# Patient Record
(demographics unavailable — no encounter records)

---

## 2025-05-02 NOTE — HISTORY OF PRESENT ILLNESS
[de-identified] : HPI: 48F with pmhx of HLD, GERD underwent colonoscopy with extensive polyps removed pathology tubular adenoma. Additional polypoid lesion seen in the terminal ileum, atypical for adenoma, biopsies taken showing neuroendocrine tumor referred by Dr. Bridges.  C/o constipation but currently going daily, tolerating diet, denies weight loss. Guaiac stool was positive at PCP and pt was referred for colonoscopy.   24 PET- 1.  Intensely somatostatin-receptor avid lesion in the terminal ileum with adjacent metastatic lymphadenopathy. 2.  Focus within the liver suspicious for metastatic disease. 3.  Intensely avid mediastinal nodes, or distant metastatic disease above the diaphragm is not excluded.    MRI abd-IMPRESSION: 1. Small subtle hepatic metastasis. 2. 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 3. Metastatic mesenteric adenopathy.  Labs- CBC CMP CA 19-9- WNL CromoA-637.6  24- Pt reports feeling well, denies abdominal pain at this time. Tolerating diet. HR in increase as pt is very anxious to find out test results.   EGD/EUS (10/16/2024): One mediastinal lymph node abutting the mid esophagus was found at 24 cm from the incisors. It was round, hypoechoic, and well defined. It measured 8 mm by 5 mm in maximal dimensions. Fine needle biopsy was performed. Also, biopsy from stomach was obtained. Path-1 Stomach biopsy 2 PeriEsophageal lymph node FNB Final Diagnosis 1. Stomach, biopsy  Chronic gastritis, mild, inactive with focal foveolar hyperplasia  Cresyl violet stain for H. pylori is negative 2. Periesophageal lymph node, fineneedle biopsy  Scant fibrous and fibromuscular tissue and heme  Tissue insufficient for evaluation Pt has been on lanreotide injection monthly with Dr. Webster. Chromogranin level decreased from 637.6 to 148.1 12/10/2024,  189.3 as of 1/10/2025.  2025 PET (NW)- 1.  Previously seen intensely somatostatin-receptor avid lesion in the terminal ileum appears somewhat smaller with decreased uptake. However, the distribution of activity at immediately adjacent lymph nodes may have changed, with some nodes now suspected to demonstrate markedly increased somatostatin receptor uptake. 2.  Revisualized focus within the LEFT lobe of the liver is essentially unchanged. Additional small focus is suspected in the RIGHT lobe along the border of the liver; repeat MRI may be helpful. 3.  Unchanged somatostatin receptor avid paraesophageal node in the chest.  2025 Pt reports feeling well. Denies nausea, vomiting, diarrhea, hematochezia or steatorrhea, and unintentional weight loss at this time.   Endoscopy: Colonoscopy:  Mammogram:  Normal  Pmhx: HLD, anxiety  Pshx: Rt partial knee replacement Fhx: Maternal granmother colon, marternal uncle- colon  Social Hx: Denies smoking quit 3 months (smoked for 30 years), ETOH use on weekends or illicit drug use. 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   [facility] PCP: Dr. LAN, [address] [place] [address]. Directions [phone]. GI: Dr. Bridges

## 2025-05-02 NOTE — CONSULT LETTER
[Dear  ___] : Dear  [unfilled], [Consult Letter:] : I had the pleasure of evaluating your patient, [unfilled]. [( Thank you for referring [unfilled] for consultation for _____ )] : Thank you for referring [unfilled] for consultation for [unfilled] [Please see my note below.] : Please see my note below. [Consult Closing:] : Thank you very much for allowing me to participate in the care of this patient.  If you have any questions, please do not hesitate to contact me. [Sincerely,] : Sincerely, [FreeTextEntry3] : Vimal Cardenas MD, FICS, FACS, ADITI Director of Surgical Oncology- [facility] , Department of Surgery [facility] of [facility] at [address]- [address]   [address] [place], [place]- [address]   (mob) [phone] (o) [phone] (f) [phone] [DrAzam  ___] : Dr. BECERRA [DrAzam ___] : Dr. BECERRA

## 2025-05-02 NOTE — REASON FOR VISIT
[de-identified] : S/p robotic right hemicolectomy with microwave ablation of segment 3 liver lesion. [de-identified] : 04/26/2025

## 2025-05-02 NOTE — REASON FOR VISIT
[de-identified] : S/p robotic right hemicolectomy with microwave ablation of segment 3 liver lesion. [de-identified] : 04/26/2025

## 2025-05-02 NOTE — ASSESSMENT
[FreeTextEntry1] : 48F with pmhx of HLD, GERD underwent colonoscopy with extensive polyps removed pathology tubular adenoma. Additional polypoid lesion seen in the terminal ileum, atypical for adenoma, biopsies taken showing neuroendocrine tumor referred by Dr. Bridges. s/p robotic right hemicolectomy with microwave ablation of segment 3 liver lesion on 04/26/2025.  9/4/24 PET- 1. Intensely somatostatin-receptor avid lesion in the terminal ileum with adjacent metastatic lymphadenopathy. 2. Focus within the liver suspicious for metastatic disease. 3. Intensely avid mediastinal nodes, or distant metastatic disease above the diaphragm is not excluded.  02/03/2025- PET scan-  1.  Previously seen intensely somatostatin-receptor avid lesion in the terminal ileum appears somewhat smaller with decreased uptake. However, the distribution of activity at immediately adjacent lymph nodes may have changed, with some nodes now suspected to demonstrate markedly increased somatostatin receptor uptake. 2.  Revisualized focus within the LEFT lobe of the liver is essentially unchanged. Additional small focus is suspected in the RIGHT lobe along the border of the liver; repeat MRI may be helpful. 3.  Unchanged somatostatin receptor avid paraesophageal node in the chest.  02/06/2025 MRI abdomen- *  Stable subcentimeter metastasis within the left hepatic lobe compared to 9/18/2024. *  No new liver lesion. Specifically, no MRI correlate for the small focus of gallium dotatate activity seen within the right hepatic lobe on prior PET/CT.  Plan: -Recovered -RTO in 2 weeks    I have discussed the diagnosis, therapeutic plan and options with the patient at length. Patient expressed verbal understanding to proceed with proposed plan. All questions answered.

## 2025-05-02 NOTE — HISTORY OF PRESENT ILLNESS
[de-identified] : HPI: 48F with pmhx of HLD, GERD underwent colonoscopy with extensive polyps removed pathology tubular adenoma. Additional polypoid lesion seen in the terminal ileum, atypical for adenoma, biopsies taken showing neuroendocrine tumor referred by Dr. Bridges.  C/o constipation but currently going daily, tolerating diet, denies weight loss. Guaiac stool was positive at PCP and pt was referred for colonoscopy.   24 PET- 1.  Intensely somatostatin-receptor avid lesion in the terminal ileum with adjacent metastatic lymphadenopathy. 2.  Focus within the liver suspicious for metastatic disease. 3.  Intensely avid mediastinal nodes, or distant metastatic disease above the diaphragm is not excluded.    MRI abd-IMPRESSION: 1. Small subtle hepatic metastasis. 2. 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 3. Metastatic mesenteric adenopathy.  Labs- CBC CMP CA 19-9- WNL CromoA-637.6  24- Pt reports feeling well, denies abdominal pain at this time. Tolerating diet. HR in increase as pt is very anxious to find out test results.   EGD/EUS (10/16/2024): One mediastinal lymph node abutting the mid esophagus was found at 24 cm from the incisors. It was round, hypoechoic, and well defined. It measured 8 mm by 5 mm in maximal dimensions. Fine needle biopsy was performed. Also, biopsy from stomach was obtained. Path-1 Stomach biopsy 2 PeriEsophageal lymph node FNB Final Diagnosis 1. Stomach, biopsy  Chronic gastritis, mild, inactive with focal foveolar hyperplasia  Cresyl violet stain for H. pylori is negative 2. Periesophageal lymph node, fineneedle biopsy  Scant fibrous and fibromuscular tissue and heme  Tissue insufficient for evaluation Pt has been on lanreotide injection monthly with Dr. Webster. Chromogranin level decreased from 637.6 to 148.1 12/10/2024,  189.3 as of 1/10/2025.  2025 PET (NW)- 1.  Previously seen intensely somatostatin-receptor avid lesion in the terminal ileum appears somewhat smaller with decreased uptake. However, the distribution of activity at immediately adjacent lymph nodes may have changed, with some nodes now suspected to demonstrate markedly increased somatostatin receptor uptake. 2.  Revisualized focus within the LEFT lobe of the liver is essentially unchanged. Additional small focus is suspected in the RIGHT lobe along the border of the liver; repeat MRI may be helpful. 3.  Unchanged somatostatin receptor avid paraesophageal node in the chest.  2025 Pt reports feeling well. Denies nausea, vomiting, diarrhea, hematochezia or steatorrhea, and unintentional weight loss at this time.   Endoscopy: Colonoscopy:  Mammogram:  Normal  Pmhx: HLD, anxiety  Pshx: Rt partial knee replacement Fhx: Maternal granmother colon, marternal uncle- colon  Social Hx: Denies smoking quit 3 months (smoked for 30 years), ETOH use on weekends or illicit drug use. 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   [facility] PCP: Dr. LAN, [address] [place] [address]. Directions [phone]. GI: Dr. Bridges

## 2025-05-02 NOTE — CONSULT LETTER
[Dear  ___] : Dear  [unfilled], [Consult Letter:] : I had the pleasure of evaluating your patient, [unfilled]. [( Thank you for referring [unfilled] for consultation for _____ )] : Thank you for referring [unfilled] for consultation for [unfilled] [Please see my note below.] : Please see my note below. [Consult Closing:] : Thank you very much for allowing me to participate in the care of this patient.  If you have any questions, please do not hesitate to contact me. [Sincerely,] : Sincerely, [FreeTextEntry3] : Vimal Cardenas MD, FICS, FACS, ADITI Director of Surgical Oncology- [facility] , Department of Surgery [facility] of Medicine at [address]- [address]   [address] [place], [place]- [address]   (mob) [phone] (o) [phone] (f) [phone] [DrAzam  ___] : Dr. BECERRA [DrAzam ___] : Dr. BECERRA

## 2025-05-22 NOTE — HISTORY OF PRESENT ILLNESS
[de-identified] : HPI: 48F with pmhx of HLD, GERD underwent colonoscopy with extensive polyps removed pathology tubular adenoma. Additional polypoid lesion seen in the terminal ileum, atypical for adenoma, biopsies taken showing neuroendocrine tumor referred by Dr. Bridges.  C/o constipation but currently going daily, tolerating diet, denies weight loss. Guaiac stool was positive at PCP and pt was referred for colonoscopy.   24 PET- 1.  Intensely somatostatin-receptor avid lesion in the terminal ileum with adjacent metastatic lymphadenopathy. 2.  Focus within the liver suspicious for metastatic disease. 3.  Intensely avid mediastinal nodes, or distant metastatic disease above the diaphragm is not excluded.    MRI abd-IMPRESSION: 1. Small subtle hepatic metastasis. 2. 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 3. Metastatic mesenteric adenopathy.  Labs- CBC CMP CA 19-9- WNL CromoA-637.6  24- Pt reports feeling well, denies abdominal pain at this time. Tolerating diet. HR in increase as pt is very anxious to find out test results.   EGD/EUS (10/16/2024): One mediastinal lymph node abutting the mid esophagus was found at 24 cm from the incisors. It was round, hypoechoic, and well defined. It measured 8 mm by 5 mm in maximal dimensions. Fine needle biopsy was performed. Also, biopsy from stomach was obtained. Path-1 Stomach biopsy 2 PeriEsophageal lymph node FNB Final Diagnosis 1. Stomach, biopsy  Chronic gastritis, mild, inactive with focal foveolar hyperplasia  Cresyl violet stain for H. pylori is negative 2. Periesophageal lymph node, fineneedle biopsy  Scant fibrous and fibromuscular tissue and heme  Tissue insufficient for evaluation Pt has been on lanreotide injection monthly with Dr. Webster. Chromogranin level decreased from 637.6 to 148.1 12/10/2024,  189.3 as of 1/10/2025.  2025 PET (NW)- 1.  Previously seen intensely somatostatin-receptor avid lesion in the terminal ileum appears somewhat smaller with decreased uptake. However, the distribution of activity at immediately adjacent lymph nodes may have changed, with some nodes now suspected to demonstrate markedly increased somatostatin receptor uptake. 2.  Revisualized focus within the LEFT lobe of the liver is essentially unchanged. Additional small focus is suspected in the RIGHT lobe along the border of the liver; repeat MRI may be helpful. 3.  Unchanged somatostatin receptor avid paraesophageal node in the chest.  2025 Pt reports feeling well. Denies nausea, vomiting, diarrhea, hematochezia or steatorrhea, and unintentional weight loss at this time.   2025- Pt reports with daughter post right hemicolectomy stating she feels well. Denies abdominal/surgical pain, n/v, diarrhea. Incisions are c/d/i. No erythema, swelling or discharge noted. Educated on signs and symptoms of infection.   Endoscopy: Colonoscopy:  Mammogram:  Normal  Pmhx: HLD, anxiety  Pshx: Rt partial knee replacement Fhx: Maternal granmother colon, marternal uncle- colon  Social Hx: Denies smoking quit 3 months (smoked for 30 years), ETOH use on weekends or illicit drug use. 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   [facility] PCP: Dr. LAN,  [place] [address]. Directions [phone]. GI: Dr. Bridges

## 2025-05-22 NOTE — CONSULT LETTER
[Dear  ___] : Dear  [unfilled], [Consult Letter:] : I had the pleasure of evaluating your patient, [unfilled]. [( Thank you for referring [unfilled] for consultation for _____ )] : Thank you for referring [unfilled] for consultation for [unfilled] [Please see my note below.] : Please see my note below. [Consult Closing:] : Thank you very much for allowing me to participate in the care of this patient.  If you have any questions, please do not hesitate to contact me. [Sincerely,] : Sincerely, [DrAzam  ___] : Dr. BECERRA [DrAzam ___] : Dr. BECERRA [FreeTextEntry3] : Vimal Cardenas MD, FICS, FACS, ADITI Director of Surgical Oncology- [facility] , Department of Surgery [facility] of [facility] at [address]- [address]   [address] [place], [place]- [address]   (mob) [phone] (o) [phone] (f) [phone]

## 2025-05-22 NOTE — ASSESSMENT
[FreeTextEntry1] : 48F with pmhx of HLD, GERD underwent colonoscopy with extensive polyps removed pathology tubular adenoma. Additional polypoid lesion seen in the terminal ileum, atypical for adenoma, biopsies taken showing neuroendocrine tumor referred by Dr. Bridges. s/p robotic right hemicolectomy with microwave ablation of segment 3 liver lesion on 04/26/2025. 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 Vascular invasion present, negative resection margins.   9/4/24 PET- 1. Intensely somatostatin-receptor avid lesion in the terminal ileum with adjacent metastatic lymphadenopathy. 2. Focus within the liver suspicious for metastatic disease. 3. Intensely avid mediastinal nodes, or distant metastatic disease above the diaphragm is not excluded.  02/03/2025- PET scan-  1.  Previously seen intensely somatostatin-receptor avid lesion in the terminal ileum appears somewhat smaller with decreased uptake. However, the distribution of activity at immediately adjacent lymph nodes may have changed, with some nodes now suspected to demonstrate markedly increased somatostatin receptor uptake. 2.  Revisualized focus within the LEFT lobe of the liver is essentially unchanged. Additional small focus is suspected in the RIGHT lobe along the border of the liver; repeat MRI may be helpful. 3.  Unchanged somatostatin receptor avid paraesophageal node in the chest.  02/06/2025 MRI abdomen- *  Stable subcentimeter metastasis within the left hepatic lobe compared to 9/18/2024. *  No new liver lesion. Specifically, no MRI correlate for the small focus of gallium dotatate activity seen within the right hepatic lobe on prior PET/CT.  Plan: -Recovered well afebrile, tolerating diet.  -Discussed path results -Continue treatment with medical oncology -Discussed transition of care plan to covering provider with patient   I have discussed the diagnosis, therapeutic plan and options with the patient at length. Patient expressed verbal understanding to proceed with proposed plan. All questions answered.

## 2025-05-22 NOTE — HISTORY OF PRESENT ILLNESS
[de-identified] : HPI: 48F with pmhx of HLD, GERD underwent colonoscopy with extensive polyps removed pathology tubular adenoma. Additional polypoid lesion seen in the terminal ileum, atypical for adenoma, biopsies taken showing neuroendocrine tumor referred by Dr. Bridges.  C/o constipation but currently going daily, tolerating diet, denies weight loss. Guaiac stool was positive at PCP and pt was referred for colonoscopy.   24 PET- 1.  Intensely somatostatin-receptor avid lesion in the terminal ileum with adjacent metastatic lymphadenopathy. 2.  Focus within the liver suspicious for metastatic disease. 3.  Intensely avid mediastinal nodes, or distant metastatic disease above the diaphragm is not excluded.    MRI abd-IMPRESSION: 1. Small subtle hepatic metastasis. 2. 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 3. Metastatic mesenteric adenopathy.  Labs- CBC CMP CA 19-9- WNL CromoA-637.6  24- Pt reports feeling well, denies abdominal pain at this time. Tolerating diet. HR in increase as pt is very anxious to find out test results.   EGD/EUS (10/16/2024): One mediastinal lymph node abutting the mid esophagus was found at 24 cm from the incisors. It was round, hypoechoic, and well defined. It measured 8 mm by 5 mm in maximal dimensions. Fine needle biopsy was performed. Also, biopsy from stomach was obtained. Path-1 Stomach biopsy 2 PeriEsophageal lymph node FNB Final Diagnosis 1. Stomach, biopsy  Chronic gastritis, mild, inactive with focal foveolar hyperplasia  Cresyl violet stain for H. pylori is negative 2. Periesophageal lymph node, fineneedle biopsy  Scant fibrous and fibromuscular tissue and heme  Tissue insufficient for evaluation Pt has been on lanreotide injection monthly with Dr. Webster. Chromogranin level decreased from 637.6 to 148.1 12/10/2024,  189.3 as of 1/10/2025.  2025 PET (NW)- 1.  Previously seen intensely somatostatin-receptor avid lesion in the terminal ileum appears somewhat smaller with decreased uptake. However, the distribution of activity at immediately adjacent lymph nodes may have changed, with some nodes now suspected to demonstrate markedly increased somatostatin receptor uptake. 2.  Revisualized focus within the LEFT lobe of the liver is essentially unchanged. Additional small focus is suspected in the RIGHT lobe along the border of the liver; repeat MRI may be helpful. 3.  Unchanged somatostatin receptor avid paraesophageal node in the chest.  2025 Pt reports feeling well. Denies nausea, vomiting, diarrhea, hematochezia or steatorrhea, and unintentional weight loss at this time.   2025- Pt reports with daughter post right hemicolectomy stating she feels well. Denies abdominal/surgical pain, n/v, diarrhea. Incisions are c/d/i. No erythema, swelling or discharge noted. Educated on signs and symptoms of infection.   Endoscopy: Colonoscopy:  Mammogram:  Normal  Pmhx: HLD, anxiety  Pshx: Rt partial knee replacement Fhx: Maternal granmother colon, marternal uncle- colon  Social Hx: Denies smoking quit 3 months (smoked for 30 years), ETOH use on weekends or illicit drug use. 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   [facility]O PCP: Dr. LAN,  [place] [address]. Directions [phone]. GI: Dr. Bridges

## 2025-05-22 NOTE — REASON FOR VISIT
[de-identified] : S/p robotic right hemicolectomy with microwave ablation of segment 3 liver lesion. [de-identified] : 04/26/2025

## 2025-05-22 NOTE — HISTORY OF PRESENT ILLNESS
[de-identified] : HPI: 48F with pmhx of HLD, GERD underwent colonoscopy with extensive polyps removed pathology tubular adenoma. Additional polypoid lesion seen in the terminal ileum, atypical for adenoma, biopsies taken showing neuroendocrine tumor referred by Dr. Bridges.  C/o constipation but currently going daily, tolerating diet, denies weight loss. Guaiac stool was positive at PCP and pt was referred for colonoscopy.   24 PET- 1.  Intensely somatostatin-receptor avid lesion in the terminal ileum with adjacent metastatic lymphadenopathy. 2.  Focus within the liver suspicious for metastatic disease. 3.  Intensely avid mediastinal nodes, or distant metastatic disease above the diaphragm is not excluded.    MRI abd-IMPRESSION: 1. Small subtle hepatic metastasis. 2. 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 3. Metastatic mesenteric adenopathy.  Labs- CBC CMP CA 19-9- WNL CromoA-637.6  24- Pt reports feeling well, denies abdominal pain at this time. Tolerating diet. HR in increase as pt is very anxious to find out test results.   EGD/EUS (10/16/2024): One mediastinal lymph node abutting the mid esophagus was found at 24 cm from the incisors. It was round, hypoechoic, and well defined. It measured 8 mm by 5 mm in maximal dimensions. Fine needle biopsy was performed. Also, biopsy from stomach was obtained. Path-1 Stomach biopsy 2 PeriEsophageal lymph node FNB Final Diagnosis 1. Stomach, biopsy  Chronic gastritis, mild, inactive with focal foveolar hyperplasia  Cresyl violet stain for H. pylori is negative 2. Periesophageal lymph node, fineneedle biopsy  Scant fibrous and fibromuscular tissue and heme  Tissue insufficient for evaluation Pt has been on lanreotide injection monthly with Dr. Webster. Chromogranin level decreased from 637.6 to 148.1 12/10/2024,  189.3 as of 1/10/2025.  2025 PET (NW)- 1.  Previously seen intensely somatostatin-receptor avid lesion in the terminal ileum appears somewhat smaller with decreased uptake. However, the distribution of activity at immediately adjacent lymph nodes may have changed, with some nodes now suspected to demonstrate markedly increased somatostatin receptor uptake. 2.  Revisualized focus within the LEFT lobe of the liver is essentially unchanged. Additional small focus is suspected in the RIGHT lobe along the border of the liver; repeat MRI may be helpful. 3.  Unchanged somatostatin receptor avid paraesophageal node in the chest.  2025 Pt reports feeling well. Denies nausea, vomiting, diarrhea, hematochezia or steatorrhea, and unintentional weight loss at this time.   2025- Pt reports with daughter post right hemicolectomy stating she feels well. Denies abdominal/surgical pain, n/v, diarrhea. Incisions are c/d/i. No erythema, swelling or discharge noted. Educated on signs and symptoms of infection.   Endoscopy: Colonoscopy:  Mammogram:  Normal  Pmhx: HLD, anxiety  Pshx: Rt partial knee replacement Fhx: Maternal granmother colon, marternal uncle- colon  Social Hx: Denies smoking quit 3 months (smoked for 30 years), ETOH use on weekends or illicit drug use. 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   [facility]O PCP: Dr. LAN,  [place] [address]. Directions [phone]. GI: Dr. Bridges

## 2025-05-22 NOTE — PHYSICAL EXAM
[Normal] : supple, no neck mass and thyroid not enlarged [Normal Neck Lymph Nodes] : normal neck lymph nodes  [Normal Supraclavicular Lymph Nodes] : normal supraclavicular lymph nodes [Normal Groin Lymph Nodes] : normal groin lymph nodes [Normal Axillary Lymph Nodes] : normal axillary lymph nodes [Normal] : oriented to person, place and time, with appropriate affect [de-identified] : Abdomen soft, nontender, nondistended, no palpable masses. [de-identified] : Incisional mild serosanguinous drainage, dry gauze placed.

## 2025-05-22 NOTE — CONSULT LETTER
[Dear  ___] : Dear  [unfilled], [Consult Letter:] : I had the pleasure of evaluating your patient, [unfilled]. [( Thank you for referring [unfilled] for consultation for _____ )] : Thank you for referring [unfilled] for consultation for [unfilled] [Please see my note below.] : Please see my note below. [Consult Closing:] : Thank you very much for allowing me to participate in the care of this patient.  If you have any questions, please do not hesitate to contact me. [Sincerely,] : Sincerely, [DrAzam  ___] : Dr. BECERRA [DrAzam ___] : Dr. BECERRA [FreeTextEntry3] : Vimal Cardenas MD, FICS, FACS, ADITI Director of Surgical Oncology- [facility] , Department of Surgery [facility] at [address]- [address]   [address] [place], [place]- [address]   (mob) [phone] (o) [phone] (f) [phone]

## 2025-05-22 NOTE — PHYSICAL EXAM
[Normal] : supple, no neck mass and thyroid not enlarged [Normal Neck Lymph Nodes] : normal neck lymph nodes  [Normal Supraclavicular Lymph Nodes] : normal supraclavicular lymph nodes [Normal Groin Lymph Nodes] : normal groin lymph nodes [Normal Axillary Lymph Nodes] : normal axillary lymph nodes [Normal] : oriented to person, place and time, with appropriate affect [de-identified] : Abdomen soft, nontender, nondistended, no palpable masses. [de-identified] : Incisional mild serosanguinous drainage, dry gauze placed.

## 2025-05-22 NOTE — REASON FOR VISIT
[de-identified] : S/p robotic right hemicolectomy with microwave ablation of segment 3 liver lesion. [de-identified] : 04/26/2025

## 2025-05-22 NOTE — PHYSICAL EXAM
[Normal] : supple, no neck mass and thyroid not enlarged [Normal Neck Lymph Nodes] : normal neck lymph nodes  [Normal Supraclavicular Lymph Nodes] : normal supraclavicular lymph nodes [Normal Groin Lymph Nodes] : normal groin lymph nodes [Normal Axillary Lymph Nodes] : normal axillary lymph nodes [Normal] : oriented to person, place and time, with appropriate affect [de-identified] : Abdomen soft, nontender, nondistended, no palpable masses. [de-identified] : Incisional mild serosanguinous drainage, dry gauze placed.

## 2025-05-22 NOTE — CONSULT LETTER
[Dear  ___] : Dear  [unfilled], [Consult Letter:] : I had the pleasure of evaluating your patient, [unfilled]. [( Thank you for referring [unfilled] for consultation for _____ )] : Thank you for referring [unfilled] for consultation for [unfilled] [Please see my note below.] : Please see my note below. [Consult Closing:] : Thank you very much for allowing me to participate in the care of this patient.  If you have any questions, please do not hesitate to contact me. [Sincerely,] : Sincerely, [DrAzam  ___] : Dr. BECERRA [DrAzam ___] : Dr. BECERRA [FreeTextEntry3] : Vimal Cardenas MD, FICS, FACS, ADITI Director of Surgical Oncology- [facility] , Department of Surgery [facility] at [address]- [address]   [address] [place], [place]- [address]   (mob) [phone] (o) [phone] (f) [phone]

## 2025-05-22 NOTE — REASON FOR VISIT
[de-identified] : S/p robotic right hemicolectomy with microwave ablation of segment 3 liver lesion. [de-identified] : 04/26/2025

## 2025-06-06 NOTE — PHYSICAL EXAM
[Normal] : supple, no neck mass and thyroid not enlarged [Normal Neck Lymph Nodes] : normal neck lymph nodes  [Normal Supraclavicular Lymph Nodes] : normal supraclavicular lymph nodes [Normal Groin Lymph Nodes] : normal groin lymph nodes [Normal Axillary Lymph Nodes] : normal axillary lymph nodes [Normal] : oriented to person, place and time, with appropriate affect [FreeTextEntry1] : Pelvic incisional site mild serosanguinous drainage, erythema  Abdomen soft, nontender, nondistended, no palpable masses. [de-identified] : Abdomen soft, nontender, nondistended, no palpable masses. [de-identified] : Incisional mild serosanguinous drainage, dry gauze placed.

## 2025-06-06 NOTE — PHYSICAL EXAM
[Normal] : supple, no neck mass and thyroid not enlarged [Normal Neck Lymph Nodes] : normal neck lymph nodes  [Normal Supraclavicular Lymph Nodes] : normal supraclavicular lymph nodes [Normal Groin Lymph Nodes] : normal groin lymph nodes [Normal Axillary Lymph Nodes] : normal axillary lymph nodes [Normal] : oriented to person, place and time, with appropriate affect [FreeTextEntry1] : Pelvic incisional site mild serosanguinous drainage, erythema  Abdomen soft, nontender, nondistended, no palpable masses. [de-identified] : Abdomen soft, nontender, nondistended, no palpable masses. [de-identified] : Incisional mild serosanguinous drainage, dry gauze placed.

## 2025-06-06 NOTE — HISTORY OF PRESENT ILLNESS
[de-identified] : HPI: 49F with pmhx of HLD, GERD underwent colonoscopy with extensive polyps removed pathology tubular adenoma. Additional polypoid lesion seen in the terminal ileum, atypical for adenoma, biopsies taken showing neuroendocrine tumor. S/p robotic right hemicolectomy with microwave ablation of segment 3 liver lesion on 04/26/2025. 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 Vascular invasion present, negative resection margins.  06/04/2025- Pt reports to the office afebrile, tolerating diet, denies abdominal pain. Has been having regular BMS with mild constipation, takes senna with improvement. Pelvic incisional site with some serous drainage, irritated by gauze.

## 2025-06-06 NOTE — HISTORY OF PRESENT ILLNESS
[de-identified] : HPI: 49F with pmhx of HLD, GERD underwent colonoscopy with extensive polyps removed pathology tubular adenoma. Additional polypoid lesion seen in the terminal ileum, atypical for adenoma, biopsies taken showing neuroendocrine tumor. S/p robotic right hemicolectomy with microwave ablation of segment 3 liver lesion on 04/26/2025. 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 Vascular invasion present, negative resection margins.  06/04/2025- Pt reports to the office afebrile, tolerating diet, denies abdominal pain. Has been having regular BMS with mild constipation, takes senna with improvement. Pelvic incisional site with some serous drainage, irritated by gauze.

## 2025-06-06 NOTE — HISTORY OF PRESENT ILLNESS
[de-identified] : HPI: 49F with pmhx of HLD, GERD underwent colonoscopy with extensive polyps removed pathology tubular adenoma. Additional polypoid lesion seen in the terminal ileum, atypical for adenoma, biopsies taken showing neuroendocrine tumor. S/p robotic right hemicolectomy with microwave ablation of segment 3 liver lesion on 04/26/2025. 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 Vascular invasion present, negative resection margins.  06/04/2025- Pt reports to the office afebrile, tolerating diet, denies abdominal pain. Has been having regular BMS with mild constipation, takes senna with improvement. Pelvic incisional site with some serous drainage, irritated by gauze.

## 2025-06-06 NOTE — PHYSICAL EXAM
[Normal] : supple, no neck mass and thyroid not enlarged [Normal Neck Lymph Nodes] : normal neck lymph nodes  [Normal Supraclavicular Lymph Nodes] : normal supraclavicular lymph nodes [Normal Groin Lymph Nodes] : normal groin lymph nodes [Normal Axillary Lymph Nodes] : normal axillary lymph nodes [Normal] : oriented to person, place and time, with appropriate affect [FreeTextEntry1] : Pelvic incisional site mild serosanguinous drainage, erythema  Abdomen soft, nontender, nondistended, no palpable masses. [de-identified] : Abdomen soft, nontender, nondistended, no palpable masses. [de-identified] : Incisional mild serosanguinous drainage, dry gauze placed.